# Patient Record
Sex: MALE | Race: WHITE | NOT HISPANIC OR LATINO | Employment: UNEMPLOYED | URBAN - METROPOLITAN AREA
[De-identification: names, ages, dates, MRNs, and addresses within clinical notes are randomized per-mention and may not be internally consistent; named-entity substitution may affect disease eponyms.]

---

## 2019-08-08 ENCOUNTER — OFFICE VISIT (OUTPATIENT)
Dept: CARDIOLOGY | Facility: CLINIC | Age: 59
End: 2019-08-08

## 2019-08-08 DIAGNOSIS — I21.02 STEMI INVOLVING LEFT ANTERIOR DESCENDING CORONARY ARTERY (HCC): ICD-10-CM

## 2019-08-08 DIAGNOSIS — I25.10 CORONARY ARTERY DISEASE INVOLVING NATIVE CORONARY ARTERY OF NATIVE HEART WITHOUT ANGINA PECTORIS: Primary | ICD-10-CM

## 2019-08-08 DIAGNOSIS — I25.5 ISCHEMIC CARDIOMYOPATHY: ICD-10-CM

## 2019-08-08 DIAGNOSIS — Z95.810 CARDIAC DEFIBRILLATOR IN PLACE: ICD-10-CM

## 2019-08-08 PROCEDURE — 99213 OFFICE O/P EST LOW 20 MIN: CPT | Performed by: INTERNAL MEDICINE

## 2019-08-08 PROCEDURE — 93289 INTERROG DEVICE EVAL HEART: CPT | Performed by: INTERNAL MEDICINE

## 2019-08-17 PROBLEM — I25.10 CORONARY ARTERY DISEASE INVOLVING NATIVE CORONARY ARTERY OF NATIVE HEART WITHOUT ANGINA PECTORIS: Status: ACTIVE | Noted: 2019-08-17

## 2019-08-17 PROBLEM — Z95.810 CARDIAC DEFIBRILLATOR IN PLACE: Status: ACTIVE | Noted: 2019-08-17

## 2019-08-17 PROBLEM — I25.5 ISCHEMIC CARDIOMYOPATHY: Status: ACTIVE | Noted: 2019-08-17

## 2019-08-17 PROBLEM — I21.02 STEMI INVOLVING LEFT ANTERIOR DESCENDING CORONARY ARTERY (HCC): Status: ACTIVE | Noted: 2019-08-17

## 2019-08-17 NOTE — PROGRESS NOTES
Subjective:     Encounter Date:08/08/2019      Patient ID: Micah Haddad is a 59 y.o. male.    Chief Complaint:  Chief Complaint   Patient presents with   • Follow-up   • Shortness of Breath       HPI:  Patient presents for followup of his CAD, ICM and ICD.    Mr Haddad is a 60 yo with a past medical history significant for HLD, tobacco use and Cad with a previous anterior MI in 2009 with a PCI/stent and ischemic cardiomyopathy. A nuclear stress test in 2010 showed a large mid-distal anterior and mid septal infarct with an EF of 30%.  He had an ICD implanted for primary prevention and it was replaced in 2017.    Since he was last seen he has been doing well without symptoms of angina or heart failure.  He denies palpitations, lightheadedness, syncope or ICD shocks.  He has stopped taking all of his meds.    The following portions of the patient's history were reviewed and updated as appropriate: allergies, current medications, past family history, past medical history, past social history, past surgical history and problem list.    Problem List:  Patient Active Problem List   Diagnosis   • Coronary artery disease involving native coronary artery of native heart without angina pectoris   • STEMI involving left anterior descending coronary artery (CMS/HCC)   • Cardiac defibrillator in place   • Ischemic cardiomyopathy       Past Medical History:  Past Medical History:   Diagnosis Date   • CAD (coronary artery disease)    • CAD (coronary artery disease)    • CHF (congestive heart failure) (CMS/HCC)    • IBS (irritable bowel syndrome)    • Ischemic cardiomyopathy    • Myocardial infarction (CMS/HCC) 2009    anterior    • VT (ventricular tachycardia) (CMS/HCC)        Past Surgical History:  Past Surgical History:   Procedure Laterality Date   • CARDIAC DEFIBRILLATOR PLACEMENT      [place]   • CORONARY ANGIOPLASTY WITH STENT PLACEMENT  2009       Social History:  Social History     Socioeconomic History   • Marital  status: Unknown     Spouse name: Not on file   • Number of children: Not on file   • Years of education: Not on file   • Highest education level: Not on file   Tobacco Use   • Smoking status: Current Every Day Smoker     Packs/day: 0.50     Years: 40.00     Pack years: 20.00     Types: Cigarettes   • Smokeless tobacco: Never Used   Substance and Sexual Activity   • Alcohol use: No     Frequency: Never       Allergies:  Allergies   Allergen Reactions   • Codeine Hives     .   • Contrast Dye Hives     .   • Latex Rash       Immunizations:    There is no immunization history on file for this patient.    ROS:  Review of Systems   Constitution: Negative for weakness and malaise/fatigue.   HENT: Negative.    Eyes: Negative.    Cardiovascular: Negative for chest pain, dyspnea on exertion, irregular heartbeat, leg swelling, near-syncope, orthopnea, palpitations and paroxysmal nocturnal dyspnea.   Respiratory: Negative for shortness of breath.    Endocrine: Negative.    Hematologic/Lymphatic: Negative.    Skin: Negative.    Musculoskeletal: Negative.    Gastrointestinal: Negative.    Genitourinary: Negative.    Neurological: Negative.    Psychiatric/Behavioral: Negative.    Allergic/Immunologic: Negative.           Objective:         There were no vitals taken for this visit.    Physical Exam   Constitutional: He is oriented to person, place, and time. He appears well-developed and well-nourished. No distress.   HENT:   Head: Normocephalic and atraumatic.   Eyes: Conjunctivae and EOM are normal. Pupils are equal, round, and reactive to light. No scleral icterus.   Neck: Normal range of motion. No thyromegaly present.   Cardiovascular: Normal rate, regular rhythm and normal heart sounds.   Pulmonary/Chest: Effort normal and breath sounds normal.   Abdominal: Soft. Bowel sounds are normal.   Musculoskeletal: Normal range of motion.   Neurological: He is alert and oriented to person, place, and time.   Skin: Skin is warm and  dry.   Psychiatric: He has a normal mood and affect.       In-Office Procedure(s):  Procedures ICD Eval interpreted  [id number]  Battery LALA    P wave 2.8mv  Threshold 0.8V  Impedance 410 ohms    R wave 12 mv  Threshold 1.2V  Impedance 640 ohms    HV 70 ohms    Events none    ASCVD RIsk Score::  The ASCVD Risk score (Norma SIM Jr., et al., 2013) failed to calculate for the following reasons:    The patient has a prior MI or stroke diagnosis    Recent Radiology:  Imaging Results (most recent)     None          Lab Review:   No visits with results within 6 Month(s) from this visit.   Latest known visit with results is:   No results found for any previous visit.                Assessment:          Diagnosis Plan   1. Coronary artery disease involving native coronary artery of native heart without angina pectoris     2. Cardiac defibrillator in place     3. Ischemic cardiomyopathy     4. STEMI involving left anterior descending coronary artery (CMS/HCC)            Plan:      1. CAD with ICM - functional class 1,  Not taking any meds, persuaded him to take ASA 81mg qd  2. ICD followup - normal device function, transfer remote monitoring to this office    RTC 6 months      Level of Care:                 Gurinder Escamilla MD  08/17/19  .

## 2020-01-06 ENCOUNTER — TELEPHONE (OUTPATIENT)
Dept: CARDIOLOGY | Facility: CLINIC | Age: 60
End: 2020-01-06

## 2020-01-06 ENCOUNTER — DOCUMENTATION (OUTPATIENT)
Dept: CARDIOLOGY | Facility: CLINIC | Age: 60
End: 2020-01-06